# Patient Record
Sex: MALE | Race: WHITE | URBAN - METROPOLITAN AREA
[De-identification: names, ages, dates, MRNs, and addresses within clinical notes are randomized per-mention and may not be internally consistent; named-entity substitution may affect disease eponyms.]

---

## 2017-01-24 ENCOUNTER — OFFICE VISIT (OUTPATIENT)
Dept: FAMILY MEDICINE | Facility: CLINIC | Age: 32
End: 2017-01-24
Payer: COMMERCIAL

## 2017-01-24 ENCOUNTER — RADIANT APPOINTMENT (OUTPATIENT)
Dept: GENERAL RADIOLOGY | Facility: CLINIC | Age: 32
End: 2017-01-24
Attending: PHYSICIAN ASSISTANT
Payer: COMMERCIAL

## 2017-01-24 VITALS
HEART RATE: 56 BPM | BODY MASS INDEX: 26.64 KG/M2 | WEIGHT: 201 LBS | OXYGEN SATURATION: 97 % | SYSTOLIC BLOOD PRESSURE: 117 MMHG | HEIGHT: 73 IN | TEMPERATURE: 97.6 F | DIASTOLIC BLOOD PRESSURE: 73 MMHG

## 2017-01-24 DIAGNOSIS — S69.91XA FINGER INJURY, RIGHT, INITIAL ENCOUNTER: ICD-10-CM

## 2017-01-24 DIAGNOSIS — T14.8XXA AVULSION FRACTURE: Primary | ICD-10-CM

## 2017-01-24 PROCEDURE — 73140 X-RAY EXAM OF FINGER(S): CPT | Mod: RT

## 2017-01-24 PROCEDURE — 99213 OFFICE O/P EST LOW 20 MIN: CPT | Performed by: PHYSICIAN ASSISTANT

## 2017-01-24 RX ORDER — ALBUTEROL SULFATE 90 UG/1
2 AEROSOL, METERED RESPIRATORY (INHALATION) EVERY 6 HOURS
Qty: 1 INHALER | Refills: 2 | Status: SHIPPED | OUTPATIENT
Start: 2017-01-24

## 2017-01-24 ASSESSMENT — PAIN SCALES - GENERAL: PAINLEVEL: NO PAIN (0)

## 2017-01-24 NOTE — NURSING NOTE
"Chief Complaint   Patient presents with     Finger     right pinky finger DOI 1/17/17       Initial /73 mmHg  Pulse 56  Temp(Src) 97.6  F (36.4  C) (Oral)  Ht 6' 0.5\" (1.842 m)  Wt 201 lb (91.173 kg)  BMI 26.87 kg/m2  SpO2 97% Estimated body mass index is 26.87 kg/(m^2) as calculated from the following:    Height as of this encounter: 6' 0.5\" (1.842 m).    Weight as of this encounter: 201 lb (91.173 kg).  BP completed using cuff size: bre Manley MA      "

## 2017-01-24 NOTE — PROGRESS NOTES
SUBJECTIVE:                                                    Trent Reese is a 31 year old male who presents to clinic today for the following health issues:      Joint Pain     Onset: 1/17/17     Description:   Location: Right pinky finger  Character: Sharp    Intensity: moderate    Progression of Symptoms: intermittent    Accompanying Signs & Symptoms:  Other symptoms: radiation of pain to hand and swelling   History:   Previous similar pain: no      Precipitating factors:   Trauma or overuse: YES    Alleviating factors:  Improved by: rest/inactivity       Therapies Tried and outcome: Ice & IBU    Patient reports that he hit his finger on a table as he was passing by the table. Injury happened at work.     Problem list and histories reviewed & adjusted, as indicated.  Additional history: as documented    Patient Active Problem List   Diagnosis     Exercise-induced asthma     CARDIOVASCULAR SCREENING; LDL GOAL LESS THAN 160     Fracture of phalanx of finger     Past Surgical History:   Procedure Laterality Date     ENT SURGERY         Social History   Substance Use Topics     Smoking status: Never Smoker     Smokeless tobacco: Never Used     Alcohol use Yes     Family History   Problem Relation Age of Onset     Asthma Father      Breast Cancer Paternal Grandmother          Current Outpatient Prescriptions   Medication Sig Dispense Refill     albuterol (PROAIR HFA/PROVENTIL HFA/VENTOLIN HFA) 108 (90 BASE) MCG/ACT Inhaler Inhale 2 puffs into the lungs every 6 hours 1 Inhaler 2     traMADol (ULTRAM) 50 MG tablet Take 1 tablet (50 mg) by mouth every 6 hours as needed for moderate pain (Patient not taking: Reported on 3/14/2017) 30 tablet 0     LORATADINE PO Reported on 3/14/2017       bismuth subsalicylate (PEPTO-BISMOL) 262 MG TABS Reported on 3/14/2017       diphenhydrAMINE (BENADRYL) 25 MG capsule Take 1 capsule by mouth every 6 hours as needed for itching. (Patient not taking: Reported on 3/14/2017) 30  "capsule 0     ibuprofen (ADVIL,MOTRIN) 200 MG tablet Take 200 mg by mouth every 4 hours as needed.       Problem list, Medication list, Allergies, and Medical/Social/Surgical histories reviewed in [facility] and updated as appropriate.    ROS:  Constitutional, HEENT, cardiovascular, pulmonary, gi and gu systems are negative, except as otherwise noted.    OBJECTIVE:                                                    /73  Pulse 56  Temp 97.6  F (36.4  C) (Oral)  Ht 6' 0.5\" (1.842 m)  Wt 201 lb (91.2 kg)  SpO2 97%  BMI 26.89 kg/m2  Body mass index is 26.89 kg/(m^2).  GENERAL: healthy, alert and no distress  MS: no swelling, no erythema. Finger has FROM, painless. No tenderness    Diagnostic Test Results:  Xray - avulsion fracture of the proximal 5th phalange     ASSESSMENT/PLAN:                                                        ICD-10-CM    1. Avulsion fracture T14.8    2. Finger injury, right, initial encounter S69.91XA XR Finger Right G/E 2 Views   Workcomp  Patient reports that he prefers not to wear a brace because his finger is healing well on its own. He has FROM and will observe for worsening or decrease in ROM  Apply ice  No heavy lifting or repetitive work with right hand  Follow up in 3 weeks if not healed .       Melissa Liao PA-C  [facility]    "

## 2017-01-24 NOTE — PATIENT INSTRUCTIONS
================================================================================  Normal Values   Blood pressure  <140/90 for most adults    <130/80 for some chronic diseases (ask your care team about yours)    BMI (body mass index)  18.5-25 kg/m2 (based on height and weight)     Thank you for visiting [facility]    Normal or non-critical lab and imaging results will be communicated to you by MyChart, letter or phone within 7 days.  If you do not hear from us within 10 days, please call the clinic. If you have a critical or abnormal lab result, we will notify you by phone as soon as possible.     If you have any questions regarding your visit please contact:     Team Comfort:   Clinic Hours Telephone Number   Dr. Jc Sandra 7am-5pm  Monday - Friday [phone]  Marcial Grullon RN   Pharmacy 8:00am-8pm Monday-Friday    9am-5pm Saturday-Sunday [phone]   Urgent Care 11am-9pm Monday-Friday        9am-5pm Saturday-Sunday [phone]     After hours, weekend or if you need to make an appointment with your primary provider please call [phone].   After Hours nurse advise: call [facility]: [phone]    Medication Refills:  Call your pharmacy and they will forward the refill to us. Please allow 3 business days for your refills to be completed.

## 2017-01-24 NOTE — Clinical Note
[address] [address]  [phone]    REPORT OF WORK ABILITY    NOTE TO EMPLOYEE: You must promptly provide a copy of this report to your  employer or worker's compensation insurer, and Qualified Rehabilitation Consultant.    Date: 1/24/2017                     Employee Name: Trent Reese         YOB: 1985  Medical Record Number: [id number]   Soc.Sec.No: [id number]  Employer: [employer] [id number]                 Date of Injury: 1/17/17  Managed Care Organization / Insurance Company Name: [employer]    Diagnosis: right MCP avulsion fracture  Work Related: yes    Permanent Partial Disability(PPD) likely: NO    EMPLOYEE IS ABLE TO WORK: unrestricted as of 1/24/17 - Full shift     RESTRICTIONS IF ANY:     TREATMENT PLAN/NOTES: icing, Ibuprofen , continuous ROM as tolerated.  Follow up in 3 weeks if not better.       Lisa Liao PAC

## 2017-01-25 ASSESSMENT — ASTHMA QUESTIONNAIRES: ACT_TOTALSCORE: 21

## 2017-03-14 ENCOUNTER — RADIANT APPOINTMENT (OUTPATIENT)
Dept: GENERAL RADIOLOGY | Facility: CLINIC | Age: 32
End: 2017-03-14
Payer: COMMERCIAL

## 2017-03-14 ENCOUNTER — OFFICE VISIT (OUTPATIENT)
Dept: FAMILY MEDICINE | Facility: CLINIC | Age: 32
End: 2017-03-14
Payer: COMMERCIAL

## 2017-03-14 VITALS
HEIGHT: 73 IN | BODY MASS INDEX: 26.64 KG/M2 | SYSTOLIC BLOOD PRESSURE: 122 MMHG | DIASTOLIC BLOOD PRESSURE: 76 MMHG | HEART RATE: 59 BPM | WEIGHT: 201 LBS | TEMPERATURE: 98.1 F | OXYGEN SATURATION: 98 %

## 2017-03-14 DIAGNOSIS — S69.91XA FINGER INJURY, RIGHT, INITIAL ENCOUNTER: Primary | ICD-10-CM

## 2017-03-14 DIAGNOSIS — S69.91XA FINGER INJURY, RIGHT, INITIAL ENCOUNTER: ICD-10-CM

## 2017-03-14 PROCEDURE — 73140 X-RAY EXAM OF FINGER(S): CPT | Mod: RT

## 2017-03-14 PROCEDURE — 99213 OFFICE O/P EST LOW 20 MIN: CPT | Performed by: PHYSICIAN ASSISTANT

## 2017-03-14 NOTE — MR AVS SNAPSHOT
After Visit Summary   3/14/2017    Trent Reese    MRN: [id number]           Patient Information     Date Of Birth          1985        Visit Information        Provider Department      3/14/2017 4:20 PM Ozzy Bush PA [facility]        Today's Diagnoses     Finger injury, right, initial encounter    -  1      Care Instructions    How to contact your care team: [phone] Pharmacy [phone]   MORGAN CHOI MD KATYA GEORGIEV, PA-C CHRIS JONES, PA-C NAM HO, MD JONATHAN BATES, MD ARVIN VOCAL, MD    Clinic hours M-Th 7am-7pm Fri 7am-5pm.   Urgent care M-F 11am-9pm  Sat/Sun 9am-5pm.   Pharmacy   Mon-Th:  8:00am-8pm   Fri:  8:00am-6:00pm  Sat/Sun  8:00am-5:00 pm       Finger Fracture, Closed  You have a broken finger (fracture). This causes local pain, swelling, and bruising. This injury takes about 4 to 6 weeks to heal. Finger injuries are often treated with a splint or cast, or by taping the injured finger to the next one (mali taping). This protects the injured finger and holds the bone in position while it heals. More serious fractures may need surgery.    If the fingernail has been severely injured, it will probably fall off in 1 to 2 weeks. A new fingernail will usually start to grow back within a month.  Home care  Follow these guidelines when caring for yourself at home:    Keep your hand elevated to reduce pain and swelling. When sitting or lying down keep your arm above the level of your heart. You can do this by placing your arm on a pillow that rests on your chest or on a pillow at your side. This is most important during the first 2 days (48 hours) after the injury.    Put an ice pack on the injured area. Do this for 20 minutes every 1 to 2 hours the first day for pain relief. You can make an ice pack by wrapping a plastic bag of ice cubes in a thin towel. As the ice melts, be careful that the cast or splint  doesn t get wet. Continue using the ice pack 3 to 4 times a day until the pain and swelling go away.    Keep the cast or splint completely dry at all times. Bathe with your cast or splint out of the water. Protect it with a large plastic bag, rubber-banded at the top end. If a fiberglass cast or splint gets wet, you can dry it with a hair dryer.    If mali tape was put on and it becomes wet or dirty, change it. You may replace it with paper, plastic, or cloth tape. Cloth tape and paper tapes must be kept dry. Keep the mali tape in place for at least 4 weeks.    You may use acetaminophen or ibuprofen to control pain, unless another pain medicine was prescribed. If you have chronic liver or kidney disease, talk with your health care provider before using these medicines. Also talk with your provider if you ve had a stomach ulcer or GI bleeding.    Don t put creams or objects under the cast if you have itching.  Follow-up care  Follow up with your health care provider within 1 week, or as advised. This is to make sure the bone is healing the way it should.  If X-rays were taken, a radiologist will look at them. You will be told of any new findings that may affect your care.  When to seek medical advice  Call your health care provider right away if any of these occur:    The plaster cast or splint becomes wet or soft    The cast cracks    The fiberglass cast or splint stays wet for more than 24 hours    Pain or swelling gets worse    Redness, warmth, swelling, drainage from the wound, or foul odor from a cast or splint    Finger becomes more cold, blue, numb, or tingly    You can t move your finger    The skin around the cast becomes red    Fever of 101 F (38.3 C) or higher, or as directed by your health care provider    5258-4396 [employer]. [address] [place] [address], [place] [address]. All rights reserved. This information is not intended as a substitute for professional medical care. Always follow your  healthcare professional's instructions.              Follow-ups after your visit        Additional Services     [facility] PT, HAND, AND CHIROPRACTIC REFERRAL       **This order will print in the [facility] Scheduling Office**    Physical Therapy, Hand Therapy and Chiropractic Care are available through:    *[facility]  *[facility]  *[facility]    Call one number to schedule at any of the above locations: [phone].    Your provider has referred you to: Hand Therapy    Indication/Reason for Referral: Hand Pain  Onset of Illness: 8 weeks  Therapy Orders: Evaluate and Treat  Special Programs: None  Special Request: None    Dinora Farias      Additional Comments for the Therapist or Chiropractor: no    Please be aware that coverage of these services is subject to the terms and limitations of your health insurance plan.  Call member services at your health plan with any benefit or coverage questions.      Please bring the following to your appointment:    *Your personal calendar for scheduling future appointments  *Comfortable clothing            ORTHO  REFERRAL       [facility] is referring you to the Orthopedic  Services at [facility].       The  Representative will assist you in the coordination of your Orthopedic and Musculoskeletal Care as prescribed by your physician.    The  Representative will call you within 1 business day to help schedule your appointment, or you may contact the  Representative at:    All areas ~ [phone]     Type of Referral : Hand specialist       Timeframe requested: Within 2 weeks    Coverage of these services is subject to the terms and limitations of your health insurance plan.  Please call member services at your health plan with any benefit or coverage questions.      If X-rays, CT or MRI's have been performed, please contact the facility where they  "were done to arrange for , prior to your scheduled appointment.  Please bring this referral request to your appointment and present it to your specialist.                  Follow-up notes from your care team     Return if symptoms worsen or fail to improve.      Who to contact     If you have questions or need follow up information about today's clinic visit or your schedule please contact [facility] directly at [phone].  Normal or non-critical lab and imaging results will be communicated to you by [employer], letter or phone within 4 business days after the clinic has received the results. If you do not hear from us within 7 days, please contact the clinic through [employer] or phone. If you have a critical or abnormal lab result, we will notify you by phone as soon as possible.  Submit refill requests through "[employer] or call your pharmacy and they will forward the refill request to us. Please allow 3 business days for your refill to be completed.          Additional Information About Your Visit        [employer] Information     "[employer] lets you send messages to your doctor, view your test results, renew your prescriptions, schedule appointments and more. To sign up, go to [email or web address] . Click on \"Log in\" on the left side of the screen, which will take you to the Welcome page. Then click on \"Sign up Now\" on the right side of the page.     You will be asked to enter the access code listed below, as well as some personal information. Please follow the directions to create your username and password.     Your access code is: [email or web address]  Expires: 2017  4:54 PM     Your access code will  in 90 days. If you need help or a new code, please call your [facility] clinic or [phone].        Care EveryWhere ID     This is your Care EveryWhere ID. This could be used by other organizations to access your [facility] medical records  [id number]        Your Vitals Were     Pulse " "Temperature Height Pulse Oximetry BMI (Body Mass Index)       59 98.1  F (36.7  C) (Oral) 6' 0.5\" (1.842 m) 98% 26.89 kg/m2        Blood Pressure from Last 3 Encounters:   03/14/17 122/76   01/24/17 117/73   10/14/16 131/79    Weight from Last 3 Encounters:   03/14/17 201 lb (91.2 kg)   01/24/17 201 lb (91.2 kg)   10/14/16 193 lb 4.8 oz (87.7 kg)              We Performed the Following     [facility] PT, HAND, AND CHIROPRACTIC REFERRAL     ORTHO  REFERRAL        Primary Care Provider Office Phone # Fax #    Higinio Dan [phone]       [facility] [address] [facility] [place] [address]        Thank you!     Thank you for choosing [facility]  for your care. Our goal is always to provide you with excellent care. Hearing back from our patients is one way we can continue to improve our services. Please take a few minutes to complete the written survey that you may receive in the mail after your visit with us. Thank you!             Your Updated Medication List - Protect others around you: Learn how to safely use, store and throw away your medicines at www.disposemymeds.org.          This list is accurate as of: 3/14/17  4:54 PM.  Always use your most recent med list.                   Brand Name Dispense Instructions for use    albuterol 108 (90 BASE) MCG/ACT Inhaler    PROAIR HFA/PROVENTIL HFA/VENTOLIN HFA    1 Inhaler    Inhale 2 puffs into the lungs every 6 hours       diphenhydrAMINE 25 MG capsule    BENADRYL    30 capsule    Take 1 capsule by mouth every 6 hours as needed for itching.       ibuprofen 200 MG tablet    ADVIL/MOTRIN     Take 200 mg by mouth every 4 hours as needed.       LORATADINE PO      Reported on 3/14/2017       PEPTO-BISMOL 262 MG Tabs   Generic drug:  bismuth subsalicylate      Reported on 3/14/2017       traMADol 50 MG tablet    ULTRAM    30 tablet    Take 1 tablet (50 mg) by mouth every 6 hours as needed for moderate pain       "

## 2017-03-14 NOTE — PROGRESS NOTES
SUBJECTIVE:                                                    Ternt Reese is a 31 year old male who presents to clinic today for the following health issues:      WORK COMP     Onset: 8 weeks ago     Description:   Location: right pinky finger   Character: Sharp    Intensity: moderate    Progression of Symptoms: worse    Accompanying Signs & Symptoms:  Other symptoms: swelling    History:   Previous similar pain: no       Precipitating factors:   Trauma or overuse: YES- hit it on table     Alleviating factors:  Improved by: nothing  Saw Lisa on the 1/24/17 for broken 5th finger      Therapies Tried and outcome: ROM , ibuprofen   still having pain, most days at last part of the day, ajdacent ot he 5th MCPJ  Does work a lot with hands as a teacher.                 Allergies   Allergen Reactions     Sulfa Drugs Hives     Patient unsure, bstates reaction happened when he was a child  Josiepierre Schmidt       Past Medical History   Diagnosis Date     Exercise-induced asthma 1/6/2011         Current Outpatient Prescriptions on File Prior to Visit:  albuterol (PROAIR HFA/PROVENTIL HFA/VENTOLIN HFA) 108 (90 BASE) MCG/ACT Inhaler Inhale 2 puffs into the lungs every 6 hours   ibuprofen (ADVIL,MOTRIN) 200 MG tablet Take 200 mg by mouth every 4 hours as needed.   traMADol (ULTRAM) 50 MG tablet Take 1 tablet (50 mg) by mouth every 6 hours as needed for moderate pain (Patient not taking: Reported on 3/14/2017)   LORATADINE PO Reported on 3/14/2017   bismuth subsalicylate (PEPTO-BISMOL) 262 MG TABS Reported on 3/14/2017   diphenhydrAMINE (BENADRYL) 25 MG capsule Take 1 capsule by mouth every 6 hours as needed for itching. (Patient not taking: Reported on 3/14/2017)     No current facility-administered medications on file prior to visit.     Social History   Substance Use Topics     Smoking status: Never Smoker     Smokeless tobacco: Never Used     Alcohol use Yes       ROS:  GEN: no fever  SKIN: as above  Musculoskel: + as  "above    OBJECTIVE:  /76  Pulse 59  Temp 98.1  F (36.7  C) (Oral)  Ht 6' 0.5\" (1.842 m)  Wt 201 lb (91.2 kg)  SpO2 98%  BMI 26.89 kg/m2   General:   awake, alert, and cooperative.  NAD.   Head: Normocephalic, atraumatic.  Eyes: Conjunctiva clear,   MS:  Rt 5th. CAROL. Sensory intact.   Cap refill intact.  nonttp  Neuro: Alert and oriented - normal speech.    XR essentially unchanged, small bony fragment to prox 5 th digit    ASSESSMENT:    ICD-10-CM    1. Finger injury, right, initial encounter S69.91XA XR Finger Right G/E 2 Views     ORTHO  REFERRAL     [facility] HAND, AND CHIROPRACTIC REFERRAL           PLAN:   Advised about symptoms which might herald more serious problems.      "

## 2017-03-14 NOTE — PATIENT INSTRUCTIONS
How to contact your care team: [phone] Pharmacy [phone]   MORGAN CHOI MD KATYA GEORGIEV, PA-C CHRIS JONES, PA-C NAM HO, MD JONATHAN BATES, MD ARVIN VOCAL, MD    Clinic hours M-Th 7am-7pm Fri 7am-5pm.   Urgent care M-F 11am-9pm  Sat/Sun 9am-5pm.   Pharmacy   Mon-Th:  8:00am-8pm   Fri:  8:00am-6:00pm  Sat/Sun  8:00am-5:00 pm       Finger Fracture, Closed  You have a broken finger (fracture). This causes local pain, swelling, and bruising. This injury takes about 4 to 6 weeks to heal. Finger injuries are often treated with a splint or cast, or by taping the injured finger to the next one (mali taping). This protects the injured finger and holds the bone in position while it heals. More serious fractures may need surgery.    If the fingernail has been severely injured, it will probably fall off in 1 to 2 weeks. A new fingernail will usually start to grow back within a month.  Home care  Follow these guidelines when caring for yourself at home:    Keep your hand elevated to reduce pain and swelling. When sitting or lying down keep your arm above the level of your heart. You can do this by placing your arm on a pillow that rests on your chest or on a pillow at your side. This is most important during the first 2 days (48 hours) after the injury.    Put an ice pack on the injured area. Do this for 20 minutes every 1 to 2 hours the first day for pain relief. You can make an ice pack by wrapping a plastic bag of ice cubes in a thin towel. As the ice melts, be careful that the cast or splint doesn t get wet. Continue using the ice pack 3 to 4 times a day until the pain and swelling go away.    Keep the cast or splint completely dry at all times. Bathe with your cast or splint out of the water. Protect it with a large plastic bag, rubber-banded at the top end. If a fiberglass cast or splint gets wet, you can dry it with a hair dryer.    If mali tape was put on and it becomes wet  or dirty, change it. You may replace it with paper, plastic, or cloth tape. Cloth tape and paper tapes must be kept dry. Keep the mali tape in place for at least 4 weeks.    You may use acetaminophen or ibuprofen to control pain, unless another pain medicine was prescribed. If you have chronic liver or kidney disease, talk with your health care provider before using these medicines. Also talk with your provider if you ve had a stomach ulcer or GI bleeding.    Don t put creams or objects under the cast if you have itching.  Follow-up care  Follow up with your health care provider within 1 week, or as advised. This is to make sure the bone is healing the way it should.  If X-rays were taken, a radiologist will look at them. You will be told of any new findings that may affect your care.  When to seek medical advice  Call your health care provider right away if any of these occur:    The plaster cast or splint becomes wet or soft    The cast cracks    The fiberglass cast or splint stays wet for more than 24 hours    Pain or swelling gets worse    Redness, warmth, swelling, drainage from the wound, or foul odor from a cast or splint    Finger becomes more cold, blue, numb, or tingly    You can t move your finger    The skin around the cast becomes red    Fever of 101 F (38.3 C) or higher, or as directed by your health care provider    3173-8610 [employer]. [address] [place] [address]. All rights reserved. This information is not intended as a substitute for professional medical care. Always follow your healthcare professional's instructions.

## 2017-03-14 NOTE — NURSING NOTE
"No chief complaint on file.      Initial /76  Pulse 59  Temp 98.1  F (36.7  C) (Oral)  Ht 6' 0.5\" (1.842 m)  Wt 201 lb (91.2 kg)  SpO2 98%  BMI 26.89 kg/m2 Estimated body mass index is 26.89 kg/(m^2) as calculated from the following:    Height as of this encounter: 6' 0.5\" (1.842 m).    Weight as of this encounter: 201 lb (91.2 kg).  Medication Reconciliation: complete     Eveline Vargas CMA      "

## 2017-03-27 ENCOUNTER — THERAPY VISIT (OUTPATIENT)
Dept: OCCUPATIONAL THERAPY | Facility: CLINIC | Age: 32
End: 2017-03-27
Payer: COMMERCIAL

## 2017-03-27 DIAGNOSIS — M79.644 PAIN OF FINGER OF RIGHT HAND: ICD-10-CM

## 2017-03-27 DIAGNOSIS — M25.641 STIFFNESS OF FINGER JOINT OF RIGHT HAND: Primary | ICD-10-CM

## 2017-03-27 DIAGNOSIS — S62.646D CLOSED NONDISPLACED FRACTURE OF PROXIMAL PHALANX OF RIGHT LITTLE FINGER WITH ROUTINE HEALING, SUBSEQUENT ENCOUNTER: ICD-10-CM

## 2017-03-27 PROCEDURE — 97110 THERAPEUTIC EXERCISES: CPT | Mod: GO | Performed by: OCCUPATIONAL THERAPIST

## 2017-03-27 NOTE — MR AVS SNAPSHOT
After Visit Summary   3/27/2017    Trent Reese    MRN: [id number]           Patient Information     Date Of Birth          1985        Visit Information        Provider Department      3/27/2017 4:30 PM Evelyn Conklin [facility] [place]        Today's Diagnoses     Stiffness of finger joint of right hand    -  1    Closed nondisplaced fracture of proximal phalanx of right little finger with routine healing, subsequent encounter        Pain of finger of right hand           Follow-ups after your visit        Your next 10 appointments already scheduled     Apr 06, 2017  4:30 PM CDT   JADE Hand with Evelyn Conklin   [facility] [place] ([facility] [place] [facility])    [address]  [place] [address]   [phone]            Apr 13, 2017  4:30 PM CDT   JADE Hand with Evelyn Conklin   [facility] [place] ([facility] [place] [facility])    [address]  [place] [address]   [phone]              Who to contact     If you have questions or need follow up information about today's clinic visit or your schedule please contact [facility] [place] directly at [phone].  Normal or non-critical lab and imaging results will be communicated to you by Cuipohart, letter or phone within 4 business days after the clinic has received the results. If you do not hear from us within 7 days, please contact the clinic through Cuipohart or phone. If you have a critical or abnormal lab result, we will notify you by phone as soon as possible.  Submit refill requests through XINTEC or call your pharmacy and they will forward the refill request to us. Please allow 3 business days for your refill to be completed.          Additional Information About Your Visit        XINTEC Information     XINTEC lets you send messages to your doctor, view your test  "results, renew your prescriptions, schedule appointments and more. To sign up, go to [email or web address] . Click on \"Log in\" on the left side of the screen, which will take you to the Welcome page. Then click on \"Sign up Now\" on the right side of the page.     You will be asked to enter the access code listed below, as well as some personal information. Please follow the directions to create your username and password.     Your access code is: [email or web address]  Expires: 2017  4:54 PM     Your access code will  in 90 days. If you need help or a new code, please call your [place] clinic or [phone].        Care EveryWhere ID     This is your Care EveryWhere ID. This could be used by other organizations to access your [place] medical records  [id number]         Blood Pressure from Last 3 Encounters:   17 122/76   17 117/73   10/14/16 131/79    Weight from Last 3 Encounters:   17 91.2 kg (201 lb)   17 91.2 kg (201 lb)   10/14/16 87.7 kg (193 lb 4.8 oz)              We Performed the Following     HC OT EVAL, LOW COMPLEXITY     JADE INITIAL EVAL REPORT     THERAPEUTIC EXERCISES        Primary Care Provider Office Phone # Fax #    Higinio Dan [phone]       [place] [address] [facility] [address]        Thank you!     Thank you for choosing [place] [facility]  for your care. Our goal is always to provide you with excellent care. Hearing back from our patients is one way we can continue to improve our services. Please take a few minutes to complete the written survey that you may receive in the mail after your visit with us. Thank you!             Your Updated Medication List - Protect others around you: Learn how to safely use, store and throw away your medicines at www.disposemymeds.org.          This list is accurate as of: 3/27/17  5:08 PM.  Always use your most recent med list.          "          Brand Name Dispense Instructions for use    albuterol 108 (90 BASE) MCG/ACT Inhaler    PROAIR HFA/PROVENTIL HFA/VENTOLIN HFA    1 Inhaler    Inhale 2 puffs into the lungs every 6 hours       diphenhydrAMINE 25 MG capsule    BENADRYL    30 capsule    Take 1 capsule by mouth every 6 hours as needed for itching.       ibuprofen 200 MG tablet    ADVIL/MOTRIN     Take 200 mg by mouth every 4 hours as needed.       LORATADINE PO      Reported on 3/14/2017       PEPTO-BISMOL 262 MG Tabs   Generic drug:  bismuth subsalicylate      Reported on 3/14/2017       traMADol 50 MG tablet    ULTRAM    30 tablet    Take 1 tablet (50 mg) by mouth every 6 hours as needed for moderate pain

## 2017-03-27 NOTE — PROGRESS NOTES
Hand Therapy Initial Evaluation    Current Date:  3/27/2017    Subjective:  Trent Reese is a 31 year old right hand dominant male.    Diagnosis:   Right small finger P1 avulsion fracture  DOI:  1/17/17    Patient reports symptoms of pain, stiffness/loss of motion, weakness/loss of strength and edema of the right small finger which occurred due to jamming finger into table at work. Since onset symptoms are gradually getting better.  Special tests:  x-ray.  Previous treatment: None.  General health as reported by patient is good.  Pertinent medical history includes:asthma  Medical allergies:Sulfa.  Surgical history: none.  Medication history: none.    Current occupation is Special    Currently working in normal job without restrictions  Job Tasks: prolonged standing, computer work, teaching and walking  Barriers include:none  Prior functional level:  no limitations    Additional Occupational Profile Information (patterns of daily living, interests, values and needs): See above    Functional Outcome Measure:  Upper Extremity Functional Index  SCORE:   Column Totals: 76/80  (A lower score indicates greater disability.)    ROM:  Small Finger  3/27/17   AROM(PROM) Lelft Right   MCP 0/80 0/70   PIP 0/100 0/95   DIP 0/85 0/75   WELLS 265 240     Strength:   (Measured in pounds)    3/27/17   Trials Left Right   1  2  3 96  94  94 97  104  90   Average: 95 97     Edema:  Circumference:  (Measured in cm)  Edema  3/27/17   Location: Left Right   P1 5.8 6.0   PIP 5.8 5.8     Sensation:  WNL throughout all nerve distributions; per patient report    Pain Report:  VAS(0-10) 3/27/17   At Rest: 0/10   With Use: 4-5/10   Worst: 7-8/10   Location:  small finger PIP and MCP joints  Pain Quality:  Sharp  Frequency: intermittent    Pain is worst:  daytime  Exacerbated by:  Hand shaking, gripping  Relieved by:  rest  Progression:  Gradually improving    Assessment:  Patient presents with symptoms consistent with diagnosis of  small finger P1 fracture, with conservative intervention.     Patient's limitations or Problem List includes:  Pain, Decreased ROM/motion, Increased edema, Weakness and Decreased  of the right small finger which interferes with the patient's ability to perform Work Tasks, Recreational Activities and Household Chores as compared to previous level of function.    Rehab Potential:  Excellent - Return to full activity, no limitations    Patient will benefit from skilled Occupational Therapy to increase ROM, flexibility, overall strength and  strength and decrease pain and edema to return to previous activity level and resume normal daily tasks and to reach their rehab potential.    Barriers to Learning:  No barrier    Communication Issues:  Patient appears to be able to clearly communicate and understand verbal and written communication and follow directions correctly.    Assessment of Occupational Performance:  3-5 Performance Deficits  Identified Performance Deficits: home establishment and management, work, carrying and using tools      Clinical Decision Making (Complexity): Low complexity    Treatment Explanation:  The following has been discussed with the patient:  RX ordered/plan of care  Anticipated outcomes  Possible risks and side effects    Plan:  Frequency:  1 X week, once daily  Duration:  for 4 weeks  Treatment Plan:    Modalities:  US, Fluidotherapy and Paraffin  Therapeutic Exercise:  AROM, AAROM, PROM, Tendon Gliding, Blocking and Isotonics  Manual Techniques:  Joint mobilization  Orthotic Fabrication:  Static progressive orthosis  Self Care:  Self Care Tasks    Discharge Plan:  Achieve all LTG.  Independent in home treatment program.  Reach maximal therapeutic benefit.    Home Exercise Program:  Warmth for stiffness  PIP and DIP blocking exercises  AROM finger E/F and tendon gliding with 3 fists  PROM composite finger flexion   strengthening with foam wedge    Next Visit:  See in 1 week  Add  paraffin or US for stiffness  Assess response to HEP  Add flexion taping as indicated

## 2017-04-06 ENCOUNTER — THERAPY VISIT (OUTPATIENT)
Dept: OCCUPATIONAL THERAPY | Facility: CLINIC | Age: 32
End: 2017-04-06
Payer: COMMERCIAL

## 2017-04-06 DIAGNOSIS — M25.641 STIFFNESS OF FINGER JOINT OF RIGHT HAND: ICD-10-CM

## 2017-04-06 DIAGNOSIS — M79.644 PAIN OF FINGER OF RIGHT HAND: ICD-10-CM

## 2017-04-06 DIAGNOSIS — S62.646D CLOSED NONDISPLACED FRACTURE OF PROXIMAL PHALANX OF RIGHT LITTLE FINGER WITH ROUTINE HEALING, SUBSEQUENT ENCOUNTER: ICD-10-CM

## 2017-04-06 PROCEDURE — 97018 PARAFFIN BATH THERAPY: CPT | Mod: GO | Performed by: OCCUPATIONAL THERAPIST

## 2017-04-06 PROCEDURE — 97140 MANUAL THERAPY 1/> REGIONS: CPT | Mod: GO | Performed by: OCCUPATIONAL THERAPIST

## 2017-04-06 PROCEDURE — 97110 THERAPEUTIC EXERCISES: CPT | Mod: GO | Performed by: OCCUPATIONAL THERAPIST

## 2017-04-06 NOTE — MR AVS SNAPSHOT
"              After Visit Summary   4/6/2017    Trent Reese    MRN: [id number]           Patient Information     Date Of Birth          1985        Visit Information        Provider Department      4/6/2017 4:30 PM Evelyn Conklin [facility] [place]        Today's Diagnoses     Stiffness of finger joint of right hand        Closed nondisplaced fracture of proximal phalanx of right little finger with routine healing, subsequent encounter        Pain of finger of right hand           Follow-ups after your visit        Your next 10 appointments already scheduled     Apr 13, 2017  4:30 PM CDT   JADE Hand with Evelyn Conklin   [facility] [place] ([facility] [place] [facility])    [address]  [place] [address] [phone]              Who to contact     If you have questions or need follow up information about today's clinic visit or your schedule please contact [facility] [place] directly at [phone].  Normal or non-critical lab and imaging results will be communicated to you by Easy-Pointhart, letter or phone within 4 business days after the clinic has received the results. If you do not hear from us within 7 days, please contact the clinic through SocialSmackt or phone. If you have a critical or abnormal lab result, we will notify you by phone as soon as possible.  Submit refill requests through Yorxs or call your pharmacy and they will forward the refill request to us. Please allow 3 business days for your refill to be completed.          Additional Information About Your Visit        [employer]inth[employer] Information     Yorxs lets you send messages to your doctor, view your test results, renew your prescriptions, schedule appointments and more. To sign up, go to [email or web address] . Click on \"Log in\" on the left side of the screen, which will take you to the Welcome page. Then click on \"Sign up Now\" " on the right side of the page.     You will be asked to enter the access code listed below, as well as some personal information. Please follow the directions to create your username and password.     Your access code is: [email or web address]  Expires: 2017  4:54 PM     Your access code will  in 90 days. If you need help or a new code, please call your [place] clinic or [phone].        Care EveryWhere ID     This is your Care EveryWhere ID. This could be used by other organizations to access your [place] medical records  [id number]         Blood Pressure from Last 3 Encounters:   17 122/76   17 117/73   10/14/16 131/79    Weight from Last 3 Encounters:   17 91.2 kg (201 lb)   17 91.2 kg (201 lb)   10/14/16 87.7 kg (193 lb 4.8 oz)              We Performed the Following     MANUAL THER TECH,1+REGIONS,EA 15 MIN     PARAFFIN BATH THERAPY     THERAPEUTIC EXERCISES        Primary Care Provider Office Phone # Fax #    Higinio Dan [phone]       [facility] [address] [facility] [address]        Thank you!     Thank you for choosing [place] [facility] [place]  for your care. Our goal is always to provide you with excellent care. Hearing back from our patients is one way we can continue to improve our services. Please take a few minutes to complete the written survey that you may receive in the mail after your visit with us. Thank you!             Your Updated Medication List - Protect others around you: Learn how to safely use, store and throw away your medicines at www.disposemymeds.org.          This list is accurate as of: 17  5:00 PM.  Always use your most recent med list.                   Brand Name Dispense Instructions for use    albuterol 108 (90 BASE) MCG/ACT Inhaler    PROAIR HFA/PROVENTIL HFA/VENTOLIN HFA    1 Inhaler    Inhale 2 puffs into the lungs every 6 hours       diphenhydrAMINE 25 MG  capsule    BENADRYL    30 capsule    Take 1 capsule by mouth every 6 hours as needed for itching.       ibuprofen 200 MG tablet    ADVIL/MOTRIN     Take 200 mg by mouth every 4 hours as needed.       LORATADINE PO      Reported on 3/14/2017       PEPTO-BISMOL 262 MG Tabs   Generic drug:  bismuth subsalicylate      Reported on 3/14/2017       traMADol 50 MG tablet    ULTRAM    30 tablet    Take 1 tablet (50 mg) by mouth every 6 hours as needed for moderate pain

## 2017-04-06 NOTE — PROGRESS NOTES
SOAP note objective information for 4/6/2017:    ROM:  Small Finger  3/27/17 4/6/17   AROM(PROM) Lelft Right Right   MCP 0/80 0/70 0/80   PIP 0/100 0/95 0/95   DIP  After Tx 0/85 075 0/75  /80   WELLS 265 240 250     Pain Report:  VAS(0-10) 3/27/17 4/6/17   At Rest: 0/10    With Use: 4-5/10 4-5/10   Worst: 7-8/10 7-8/10   Location:  small finger PIP and MCP joints    Home Exercise Program:  Warmth for stiffness  PIP and DIP blocking exercises  AROM finger E/F and tendon gliding with 3 fists, focus on hook position for DIP ROM  PROM hook fist and composite finger flexion   strengthening with foam wedge    Next Visit:  See in 1 week  Continue paraffin for stiffness  Assess response to active and passive hook flexion  Add hook fist strengthening and flexion taping as indicated   Consider D/C to HEP if doing well    Please refer to the daily flowsheet for treatment today, total treatment time and time spent performing 1:1 timed codes.

## 2017-04-13 ENCOUNTER — THERAPY VISIT (OUTPATIENT)
Dept: OCCUPATIONAL THERAPY | Facility: CLINIC | Age: 32
End: 2017-04-13
Payer: COMMERCIAL

## 2017-04-13 DIAGNOSIS — S62.646D CLOSED NONDISPLACED FRACTURE OF PROXIMAL PHALANX OF RIGHT LITTLE FINGER WITH ROUTINE HEALING, SUBSEQUENT ENCOUNTER: ICD-10-CM

## 2017-04-13 DIAGNOSIS — M25.641 STIFFNESS OF FINGER JOINT OF RIGHT HAND: ICD-10-CM

## 2017-04-13 DIAGNOSIS — M79.644 PAIN OF FINGER OF RIGHT HAND: ICD-10-CM

## 2017-04-13 PROCEDURE — 97110 THERAPEUTIC EXERCISES: CPT | Mod: GO | Performed by: OCCUPATIONAL THERAPIST

## 2017-04-13 PROCEDURE — 97018 PARAFFIN BATH THERAPY: CPT | Mod: GO | Performed by: OCCUPATIONAL THERAPIST

## 2017-04-13 PROCEDURE — 97140 MANUAL THERAPY 1/> REGIONS: CPT | Mod: GO | Performed by: OCCUPATIONAL THERAPIST

## 2017-04-13 NOTE — MR AVS SNAPSHOT
"              After Visit Summary   4/13/2017    Trent Reese    MRN: [id number]           Patient Information     Date Of Birth          1985        Visit Information        Provider Department      4/13/2017 4:30 PM Evelyn Conklin [facility] [place]        Today's Diagnoses     Stiffness of finger joint of right hand        Closed nondisplaced fracture of proximal phalanx of right little finger with routine healing, subsequent encounter        Pain of finger of right hand           Follow-ups after your visit        Who to contact     If you have questions or need follow up information about today's clinic visit or your schedule please contact [facility] [place] directly at [phone].  Normal or non-critical lab and imaging results will be communicated to you by [place] Lab[facility]art, letter or phone within 4 business days after the clinic has received the results. If you do not hear from us within 7 days, please contact the clinic through [place] Labshart or phone. If you have a critical or abnormal lab result, we will notify you by phone as soon as possible.  Submit refill requests through [employer] or call your pharmacy and they will forward the refill request to us. Please allow 3 business days for your refill to be completed.          Additional Information About Your Visit        MyChart Information     [employer]P lets you send messages to your doctor, view your test results, renew your prescriptions, schedule appointments and more. To sign up, go to [email or web address] . Click on \"Log in\" on the left side of the screen, which will take you to the Welcome page. Then click on \"Sign up Now\" on the right side of the page.     You will be asked to enter the access code listed below, as well as some personal information. Please follow the directions to create your username and password.     Your access code is: [email or web address]  Expires: 6/12/2017  4:54 PM   "   Your access code will  in 90 days. If you need help or a new code, please call your [place] clinic or [phone].        Care EveryWhere ID     This is your Care EveryWhere ID. This could be used by other organizations to access your [place] medical records  [id number]         Blood Pressure from Last 3 Encounters:   17 122/76   17 117/73   10/14/16 131/79    Weight from Last 3 Encounters:   17 91.2 kg (201 lb)   17 91.2 kg (201 lb)   10/14/16 87.7 kg (193 lb 4.8 oz)              We Performed the Following     JADE PROGRESS NOTES REPORT     MANUAL THER TECH,1+REGIONS,EA 15 MIN     PARAFFIN BATH THERAPY     THERAPEUTIC EXERCISES        Primary Care Provider Office Phone # Fax #    Higinio Dan [phone]       [facility] [address] [facility] [address]        Thank you!     Thank you for choosing [facility] [place]  for your care. Our goal is always to provide you with excellent care. Hearing back from our patients is one way we can continue to improve our services. Please take a few minutes to complete the written survey that you may receive in the mail after your visit with us. Thank you!             Your Updated Medication List - Protect others around you: Learn how to safely use, store and throw away your medicines at www.disposemymeds.org.          This list is accurate as of: 17  4:58 PM.  Always use your most recent med list.                   Brand Name Dispense Instructions for use    albuterol 108 (90 BASE) MCG/ACT Inhaler    PROAIR HFA/PROVENTIL HFA/VENTOLIN HFA    1 Inhaler    Inhale 2 puffs into the lungs every 6 hours       diphenhydrAMINE 25 MG capsule    BENADRYL    30 capsule    Take 1 capsule by mouth every 6 hours as needed for itching.       ibuprofen 200 MG tablet    ADVIL/MOTRIN     Take 200 mg by mouth every 4 hours as needed.       LORATADINE PO      Reported on  3/14/2017       PEPTO-BISMOL 262 MG Tabs   Generic drug:  bismuth subsalicylate      Reported on 3/14/2017       traMADol 50 MG tablet    ULTRAM    30 tablet    Take 1 tablet (50 mg) by mouth every 6 hours as needed for moderate pain

## 2017-04-13 NOTE — PROGRESS NOTES
Hand Therapy Progress/Discharge Note    Current Date:  4/13/2017    Reporting period is 3/27/2017 to 4/13/2017    Diagnosis:   Right small finger P1 avulsion fracture  DOI:  1/17/17    Subjective:   Subjective changes noted by patient:  Still get sharp pain if I tweak it but exercises feel good.  Functional changes noted by patient:  Improvement in Work Tasks  Patient has noted adverse reaction to:  None    Functional Outcome Measure:  Upper Extremity Functional Index  SCORE:   Column Totals: 78/80  (A lower score indicates greater disability.)    Objective:  ROM:    Small Finger  3/27/17 4/13/17   AROM(PROM) Lelft Right Right   MCP 0/80 0/70 0/75   PIP 0/100 0/95 0/100   DIP 0/85 0/75 0/85   WELLS 265 240 260     Strength:   (Measured in pounds)    3/27/17 4/13/17   Trials Left Right Right   1  2  3 96  94  94 97  104  90 100  105  104   Average: 95 97 103     Edema:  Circumference:  (Measured in cm)  Edema  3/27/17 4/13/17   Location: Left Right Right   P1 5.8 6.0 5.9   PIP 5.8 5.8 5.8     Sensation:  WNL throughout all nerve distributions; per patient report    Pain Report:  VAS(0-10) 3/27/17 4/13/17   At Rest: 0/10    With Use: 4-5/10 0/10   Worst: 7-8/10 5/10   Location:  small finger PIP and MCP joints    Please refer to the daily flowsheet for treatment provided today.     Assessment:  Response to therapy has been improvement to:  ROM of Fingers: All Planes  Strength:    Edema:  Decreased circumference  Pain:  intensity of pain is decreased    Overall Assessment:  Patient's symptoms are resolving and patient isndependent in home exercise program  STG/LTG:  STGoals have been reviewed and progress or achievement has occurred;  see goal sheet for details and updates.  I have re-evaluated this patient and find that the nature, scope, duration and intensity of the therapy is appropriate for the medical condition of the patient.    Plan:  Frequency/Duration:  Discharge from Hand Therapy; continue home  program.    Recommendations for Continued Therapy  Home Exercise Program:  Warmth for stiffness  PIP and DIP blocking exercises  AROM finger E/F and tendon gliding with 3 fists, focus on hook position for DIP ROM  PROM hook fist and composite finger flexion  Hook fist and full  strengthening with foam wedge

## 2017-05-22 ENCOUNTER — TELEPHONE (OUTPATIENT)
Dept: FAMILY MEDICINE | Facility: CLINIC | Age: 32
End: 2017-05-22

## 2017-05-22 NOTE — TELEPHONE ENCOUNTER
I can't state that injury was work related because patient did not mention that his injury is from work on 1/24/17. We addressed it as a non work related injury and also addressed his asthma in the same visit. So the visit was not work comp and there is nothing in the note that states that.     Lisa Liao PAC

## 2017-05-22 NOTE — TELEPHONE ENCOUNTER
Patient notified of the message below.  Patient states that the whole reason for the visit was for work injury for a broken finger.  Stated that he informed the  that the reason was for a work injury.  The reason that asthma was addressed at the visit was because the nurse gave him a questionnaire to complete.  Patient would not have mentioned asthma if it wasn't for the questionnaire.    Routing to provider. Please advise.    Yadi Thomas RN

## 2017-05-22 NOTE — TELEPHONE ENCOUNTER
Pt needs a letter stating his injury from 1/24/17 visit   Was indeed work related  And also list all the visits that he had related to the injury    Diagnosis-prognosis included    Call when ready at the clinic   [phone]    Thank you

## 2017-05-22 NOTE — TELEPHONE ENCOUNTER
This visit was so long ago, i do not remember and have to rely on the note.   After reviewing it again i noticed that I have given him a workability report after the office visit. i can print it and he can take it to his employer.        Workability report from 1/24/17 was printed and placed into [employer] [place]

## 2017-05-23 NOTE — TELEPHONE ENCOUNTER
Pt dropped off a work report that has a copy of Lisa workability letter attached to it already. The  did put the work report in my box at the  but this report from pt will need to go upstairs to the 2nd floor where Lisa is today to review and address. There is another encounter regarding pt dropping off this work report, I will close this one.  Paulino Castle,  [employer]